# Patient Record
Sex: FEMALE | Race: WHITE | NOT HISPANIC OR LATINO | Employment: OTHER | URBAN - METROPOLITAN AREA
[De-identification: names, ages, dates, MRNs, and addresses within clinical notes are randomized per-mention and may not be internally consistent; named-entity substitution may affect disease eponyms.]

---

## 2019-05-14 ENCOUNTER — CLINICAL SUPPORT (OUTPATIENT)
Dept: SMOKING CESSATION | Facility: CLINIC | Age: 59
End: 2019-05-14
Payer: COMMERCIAL

## 2019-05-14 VITALS
SYSTOLIC BLOOD PRESSURE: 132 MMHG | HEART RATE: 88 BPM | HEIGHT: 65 IN | WEIGHT: 224 LBS | DIASTOLIC BLOOD PRESSURE: 101 MMHG | BODY MASS INDEX: 37.32 KG/M2 | RESPIRATION RATE: 16 BRPM

## 2019-05-14 DIAGNOSIS — F17.210 HEAVY CIGARETTE SMOKER (20-39 PER DAY): Primary | ICD-10-CM

## 2019-05-14 PROCEDURE — 99404 PREV MED CNSL INDIV APPRX 60: CPT | Mod: S$GLB,,,

## 2019-05-14 PROCEDURE — 99999 PR PBB SHADOW E&M-NEW PATIENT-LVL III: CPT | Mod: PBBFAC,,,

## 2019-05-14 PROCEDURE — 99999 PR PBB SHADOW E&M-NEW PATIENT-LVL III: ICD-10-PCS | Mod: PBBFAC,,,

## 2019-05-14 PROCEDURE — 99404 PR PREVENT COUNSEL,INDIV,60 MIN: ICD-10-PCS | Mod: S$GLB,,,

## 2019-05-14 RX ORDER — IBUPROFEN 200 MG
1 TABLET ORAL DAILY
Qty: 14 PATCH | Refills: 0 | Status: SHIPPED | OUTPATIENT
Start: 2019-05-14 | End: 2019-05-27 | Stop reason: SDUPTHER

## 2019-05-14 NOTE — Clinical Note
Patient will be participating in weekly tobacco cessation meetings and will begin the prescribed tobacco cessation medication regimen of 21 mg patches.Patient applying for the [employer] free Chantix program and will begin the program with nicotine patches. FTND of 8 indicates a high dependence to tobacco. SALOME-D of 2 is perceived as no mental distress or depression at this time.

## 2019-05-20 ENCOUNTER — CLINICAL SUPPORT (OUTPATIENT)
Dept: SMOKING CESSATION | Facility: CLINIC | Age: 59
End: 2019-05-20
Payer: COMMERCIAL

## 2019-05-20 DIAGNOSIS — F17.210 HEAVY CIGARETTE SMOKER (20-39 PER DAY): Primary | ICD-10-CM

## 2019-05-20 PROCEDURE — 99999 PR PBB SHADOW E&M-EST. PATIENT-LVL I: CPT | Mod: PBBFAC,,,

## 2019-05-20 PROCEDURE — 90853 PR GROUP PSYCHOTHERAPY: ICD-10-PCS | Mod: S$GLB,,,

## 2019-05-20 PROCEDURE — 90853 GROUP PSYCHOTHERAPY: CPT | Mod: S$GLB,,,

## 2019-05-20 PROCEDURE — 99999 PR PBB SHADOW E&M-EST. PATIENT-LVL I: ICD-10-PCS | Mod: PBBFAC,,,

## 2019-05-20 NOTE — Clinical Note
Just a note to advise how the patient is progressing in the tobacco cessation program. The patient is smoking 10 cigarettes per day but smoking each at least 2 times and sometimes 3. This increases her habit to a pack or more per day. The patient will begin the habit modification techniques and the 21 mg nicotine patches on 5/21/19.

## 2019-05-21 NOTE — PROGRESS NOTES
Site: [facility]  Date:  5/20/19  Clinical Status of Patient: Outpatient   Length of Service and Code: 60 minutes - 27078   Number in Attendance: 2  Group Activities/Focus of Group:  orientation, client introductions, completion of TCRS (Tobacco Cessation Rating Scale) learned addiction model, cues/triggers, personal reasons for quitting, medications, goals, quit date    Target symptoms:  withdrawal and medication side effects             The following were rated moderate (3) to severe (4) on TCRS:       Moderate 3: none      Severe 4:   none  Patient's Response to Intervention: The patient is smoking 10 cigarettes per day but smoking each at least 2 times and sometimes 3. This increases her habit to a pack or more per day. The patient will begin the habit modification techniques on 5/21/19. Patient remains on prescribed tobacco cessation medication regimen of 21 mg patch without any negative side effects at this time.  Progress Toward Goals and Other Mental Status Changes: The patient denies any abnormal behavioral or mental changes at this time.     Diagnosis: Z72.0  Plan: The patient will continue with group therapy sessions and medication monitoring by CTTS. Prescribed medication management will be by physician.   Return to Clinic: 1 week

## 2019-05-22 ENCOUNTER — TELEPHONE (OUTPATIENT)
Dept: PHARMACY | Facility: CLINIC | Age: 59
End: 2019-05-22

## 2019-05-27 ENCOUNTER — CLINICAL SUPPORT (OUTPATIENT)
Dept: SMOKING CESSATION | Facility: CLINIC | Age: 59
End: 2019-05-27
Payer: COMMERCIAL

## 2019-05-27 DIAGNOSIS — F17.210 HEAVY CIGARETTE SMOKER (20-39 PER DAY): ICD-10-CM

## 2019-05-27 PROCEDURE — 90853 PR GROUP PSYCHOTHERAPY: ICD-10-PCS | Mod: S$GLB,,,

## 2019-05-27 PROCEDURE — 99999 PR PBB SHADOW E&M-EST. PATIENT-LVL I: ICD-10-PCS | Mod: PBBFAC,,,

## 2019-05-27 PROCEDURE — 99999 PR PBB SHADOW E&M-EST. PATIENT-LVL I: CPT | Mod: PBBFAC,,,

## 2019-05-27 PROCEDURE — 90853 GROUP PSYCHOTHERAPY: CPT | Mod: S$GLB,,,

## 2019-05-27 RX ORDER — IBUPROFEN 200 MG
1 TABLET ORAL DAILY
Qty: 14 PATCH | Refills: 0 | Status: SHIPPED | OUTPATIENT
Start: 2019-05-27 | End: 2019-06-18

## 2019-05-27 RX ORDER — BUPROPION HYDROCHLORIDE 150 MG/1
TABLET, EXTENDED RELEASE ORAL
Qty: 60 TABLET | Refills: 0 | Status: SHIPPED | OUTPATIENT
Start: 2019-05-27 | End: 2019-07-08 | Stop reason: SDUPTHER

## 2019-05-28 NOTE — PROGRESS NOTES
Smoking Cessation Group Session #2    Site: [facility]  Date:  5/27/19  Clinical Status of Patient: Outpatient   Length of Service and Code: 60 minutes - 12787   Number in Attendance: 2  Group Activities/Focus of Group:  completion of TCRS (Tobacco Cessation Rating Scale) reviewed strategies, cues, and triggers. Introduced the negative impact of tobacco on health, the health advantages of discontinuing the use of tobacco, time line improved health changes after a quit, withdrawal issues to expect from nicotine and habit, and ways to achieve the goal of a quit.    Target symptoms:  withdrawal and medication side effects             The following were rated moderate (3) to severe (4) on TCRS:       Moderate 3: none     Severe 4:   none  Patient's Response to Intervention: The patient is smoking 10 cigarettes per day but lighting each twice which is a pack a day habit. Patient did not utilize any of the habit modification techniques discussed last week but stated she would try this week. Added 150 mg Wellbutrin SR once a day for 7 days then increasing to BID to aid the patient since the 21 mg nicotine patches are not helping at this time. The patient has applied for the [employer] patient assistance program but has not been approved yet.    Progress Toward Goals and Other Mental Status Changes: The patient denies any abnormal behavioral or mental changes at this time.     Diagnosis: Z72.0  Plan: The patient will continue with group therapy sessions and medication monitoring by CTTS. Prescribed medication management will be by physician.   Return to Clinic: 1 week    Quit Date:    Planned Quit Date:

## 2019-06-03 ENCOUNTER — CLINICAL SUPPORT (OUTPATIENT)
Dept: SMOKING CESSATION | Facility: CLINIC | Age: 59
End: 2019-06-03
Payer: COMMERCIAL

## 2019-06-03 DIAGNOSIS — F17.210 LIGHT CIGARETTE SMOKER (1-9 CIGS/DAY): Primary | ICD-10-CM

## 2019-06-03 PROCEDURE — 90853 PR GROUP PSYCHOTHERAPY: ICD-10-PCS | Mod: S$GLB,,,

## 2019-06-03 PROCEDURE — 99999 PR PBB SHADOW E&M-EST. PATIENT-LVL I: ICD-10-PCS | Mod: PBBFAC,,,

## 2019-06-03 PROCEDURE — 90853 GROUP PSYCHOTHERAPY: CPT | Mod: S$GLB,,,

## 2019-06-03 PROCEDURE — 99999 PR PBB SHADOW E&M-EST. PATIENT-LVL I: CPT | Mod: PBBFAC,,,

## 2019-06-04 ENCOUNTER — TELEPHONE (OUTPATIENT)
Dept: PHARMACY | Facility: CLINIC | Age: 59
End: 2019-06-04

## 2019-06-04 NOTE — PROGRESS NOTES
Smoking Cessation Group Session #2    Site: [facility]  Date:  6/3/19  Clinical Status of Patient: Outpatient   Length of Service and Code: 60 minutes - 51506   Number in Attendance: 2  Group Activities/Focus of Group:  completion of TCRS (Tobacco Cessation Rating Scale) reviewed strategies, controlling environment, cues, triggers, new goals set. Introduced high risk situations with preparation interventions, caffeine similarities with withdrawal issues of habit and nicotine, Alcohol, Understanding urges, cravings, stress and relaxation. Open discussion with intervention discussion.    Target symptoms:  withdrawal and medication side effects             The following were rated moderate (3) to severe (4) on TCRS:       Moderate 3: none     Severe 4:   none  Patient's Response to Intervention: The patient is smoking 6 cigarettes per day and a quit was discussed but not set at this time. Additional habit modification techniques were discussed and will be utilized this week. The patient remains on the prescribed tobacco cessation medication regimen of 150 mg Wellbutrin SR BID with the Chantix BID without any negative side effects at this time.     Progress Toward Goals and Other Mental Status Changes: The patient denies any abnormal behavioral or mental changes at this time.     Diagnosis: Z72.0  Plan: The patient will continue with group therapy sessions and medication monitoring by CTTS. Prescribed medication management will be by physician.   Return to Clinic: 1 week    Quit Date:    Planned Quit Date:

## 2019-06-10 ENCOUNTER — CLINICAL SUPPORT (OUTPATIENT)
Dept: SMOKING CESSATION | Facility: CLINIC | Age: 59
End: 2019-06-10
Payer: COMMERCIAL

## 2019-06-10 DIAGNOSIS — F17.210 CIGARETTE NICOTINE DEPENDENCE, UNCOMPLICATED: Primary | ICD-10-CM

## 2019-06-10 PROCEDURE — 99404 PREV MED CNSL INDIV APPRX 60: CPT | Mod: S$GLB,,,

## 2019-06-10 PROCEDURE — 99999 PR PBB SHADOW E&M-EST. PATIENT-LVL I: CPT | Mod: PBBFAC,,,

## 2019-06-10 PROCEDURE — 99999 PR PBB SHADOW E&M-EST. PATIENT-LVL I: ICD-10-PCS | Mod: PBBFAC,,,

## 2019-06-10 PROCEDURE — 99404 PR PREVENT COUNSEL,INDIV,60 MIN: ICD-10-PCS | Mod: S$GLB,,,

## 2019-06-11 NOTE — PROGRESS NOTES
Individual Follow-Up Form    6/10/19    Quit Date: 6/9/19    Clinical Status of Patient: Outpatient    Length of Service: 60 minutes    Continuing Medication: yes  Chantix    Other Medications: 150 mg Wellbutrin SR     Target Symptoms: Withdrawal and medication side effects. The following were  rated moderate (3) to severe (4) on TCRS:  · Moderate (3): none  · Severe (4): Desire, crave tobacco: reviewed with the patient habit versus nicotine side effects with a quit attempt.     Comments: completion of TCRS (Tobacco Cessation Rating Scale) reviewed strategies, habitual behavior, high risks situations, understanding urges and cravings, stress and relaxation with open discussion and additional interventions, Introduced lapses, relapses, understanding them and analyzing the situation of a lapse, conflict issues that may be linked to a lapse. The patient is tobacco free as of 6/9/19 and is experiencing the following negative side effect: Desire, crave tobacco: reviewed with the patient habit versus nicotine side effects with a quit attempt. The patient remains on the prescribed tobacco cessation medication regimen of 1 mg Chantix BID with the 150 mg Wellbutrin SR BID without any negative side effects at this time. The patient denies any abnormal behavioral or mental changes at this time. The patient will continue with group therapy sessions and medication monitoring by CTTS. Prescribed medication management will be by physician.     Diagnosis: F17.210    Next Visit: 1 week

## 2019-06-17 ENCOUNTER — CLINICAL SUPPORT (OUTPATIENT)
Dept: SMOKING CESSATION | Facility: CLINIC | Age: 59
End: 2019-06-17
Payer: COMMERCIAL

## 2019-06-17 DIAGNOSIS — F17.210 CIGARETTE NICOTINE DEPENDENCE, UNCOMPLICATED: Primary | ICD-10-CM

## 2019-06-17 PROCEDURE — 99999 PR PBB SHADOW E&M-EST. PATIENT-LVL I: CPT | Mod: PBBFAC,,,

## 2019-06-17 PROCEDURE — 99999 PR PBB SHADOW E&M-EST. PATIENT-LVL I: ICD-10-PCS | Mod: PBBFAC,,,

## 2019-06-17 PROCEDURE — 99404 PR PREVENT COUNSEL,INDIV,60 MIN: ICD-10-PCS | Mod: S$GLB,,,

## 2019-06-17 PROCEDURE — 99404 PREV MED CNSL INDIV APPRX 60: CPT | Mod: S$GLB,,,

## 2019-06-18 RX ORDER — VARENICLINE TARTRATE 1 MG/1
0.5 TABLET, FILM COATED ORAL 2 TIMES DAILY
COMMUNITY
End: 2019-07-23 | Stop reason: ALTCHOICE

## 2019-06-18 NOTE — PROGRESS NOTES
Individual Follow-Up Form    6/17/19    Quit Date: 6/9/19    Clinical Status of Patient: Outpatient    Length of Service: 60 minutes    Continuing Medication: yes  Chantix    Other Medications: Wellbutrin 150 mg SR BID     Target Symptoms: Withdrawal and medication side effects. The following were  rated moderate (3) to severe (4) on TCRS:  · Moderate (3): none  · Severe (4): none    Comments: completion of TCRS (Tobacco Cessation Rating Scale) reviewed strategies, habitual behavior, stress, and high risk situations. Introduced stress with addition interventions, SOLVE, relaxation with interventions, nutrition, exercise, weight gain, and the importance of rewarding oneself for accomplishments toward becoming tobacco free. Open discussion of all items with interventions. The patient remains tobacco free as of 6/9/19 and is not experiencing any negative side effects at this time. The patient [place] and did not experience any issues that she expected to experience. The patient remains on the prescribed tobacco cessation medication regimen of 0.5 mg Chantix once a day with the 150 mg Wellbutrin SR BID without any negative side effects at this time. The patient denies any abnormal behavioral or mental changes at this time. The patient will continue with group therapy sessions and medication monitoring by CTTS. Prescribed medication management will be by physician.     Diagnosis: F17.210    Next Visit: 1 week

## 2019-06-24 ENCOUNTER — CLINICAL SUPPORT (OUTPATIENT)
Dept: SMOKING CESSATION | Facility: CLINIC | Age: 59
End: 2019-06-24
Payer: COMMERCIAL

## 2019-06-24 DIAGNOSIS — F17.210 CIGARETTE NICOTINE DEPENDENCE, UNCOMPLICATED: Primary | ICD-10-CM

## 2019-06-24 PROCEDURE — 99999 PR PBB SHADOW E&M-EST. PATIENT-LVL I: ICD-10-PCS | Mod: PBBFAC,,,

## 2019-06-24 PROCEDURE — 99404 PR PREVENT COUNSEL,INDIV,60 MIN: ICD-10-PCS | Mod: S$GLB,,,

## 2019-06-24 PROCEDURE — 99999 PR PBB SHADOW E&M-EST. PATIENT-LVL I: CPT | Mod: PBBFAC,,,

## 2019-06-24 PROCEDURE — 99404 PREV MED CNSL INDIV APPRX 60: CPT | Mod: S$GLB,,,

## 2019-06-24 NOTE — Clinical Note
Just a note to advise how the patient is progressing in the tobacco cessation program. The patient remains tobacco free as of 6/9/19 and is not experiencing any negative side effects at this time. The patient did discuss a strong urge with the anxiety of going out to a play but was able to over come. Additional interventions were discussed and reviewed relaxation exercise to aid the patient. The patient remains on the prescribed tobacco cessation medication regimen of 1 mg Chantix once a day with the 150 mg Wellbutrin SR BID without any negative side effects at this time.

## 2019-06-25 NOTE — PROGRESS NOTES
Individual Follow-Up Form    6/24/19    Quit Date: 6/9/19    Clinical Status of Patient: Outpatient    Length of Service: 60 minutes    Continuing Medication: yes  Chantix    Other Medications: Wellbutrin 150 mg SR     Target Symptoms: Withdrawal and medication side effects. The following were  rated moderate (3) to severe (4) on TCRS:  · Moderate (3): none  · Severe (4): none    Comments: completion of TCRS (Tobacco Cessation Rating Scale) reviewed strategies, habitual behavior, high risks situations, understanding urges and cravings, stress and relaxation with open discussion and additional interventions, Introduced lapses, relapses, understanding them and analyzing the situation of a lapse, conflict issues that may be linked to a lapse. The patient remains tobacco free as of 6/9/19 and is not experiencing any negative side effects at this time. The patient did discuss a strong urge with the anxiety of going out to a play but was able to over come. Additional interventions were discussed and reviewed relaxation exercise to aid the patient. The patient remains on the prescribed tobacco cessation medication regimen of 1 mg Chantix once a day with the 150 mg Wellbutrin SR BID without any negative side effects at this time. The patient denies any abnormal behavioral or mental changes at this time. The patient will continue with group therapy sessions and medication monitoring by CTTS. Prescribed medication management will be by physician.     Diagnosis: F17.210    Next Visit: 2 weeks

## 2019-07-01 ENCOUNTER — CLINICAL SUPPORT (OUTPATIENT)
Dept: SMOKING CESSATION | Facility: CLINIC | Age: 59
End: 2019-07-01
Payer: COMMERCIAL

## 2019-07-01 DIAGNOSIS — F17.210 CIGARETTE NICOTINE DEPENDENCE, UNCOMPLICATED: Primary | ICD-10-CM

## 2019-07-01 PROCEDURE — 99999 PR PBB SHADOW E&M-EST. PATIENT-LVL I: ICD-10-PCS | Mod: PBBFAC,,,

## 2019-07-01 PROCEDURE — 99999 PR PBB SHADOW E&M-EST. PATIENT-LVL I: CPT | Mod: PBBFAC,,,

## 2019-07-01 PROCEDURE — 99406 BEHAV CHNG SMOKING 3-10 MIN: CPT | Mod: S$GLB,,,

## 2019-07-01 PROCEDURE — 99406 PR TOBACCO USE CESSATION INTERMEDIATE 3-10 MINUTES: ICD-10-PCS | Mod: S$GLB,,,

## 2019-07-08 ENCOUNTER — CLINICAL SUPPORT (OUTPATIENT)
Dept: SMOKING CESSATION | Facility: CLINIC | Age: 59
End: 2019-07-08
Payer: COMMERCIAL

## 2019-07-08 DIAGNOSIS — F17.210 CIGARETTE NICOTINE DEPENDENCE, UNCOMPLICATED: Primary | ICD-10-CM

## 2019-07-08 PROCEDURE — 99999 PR PBB SHADOW E&M-EST. PATIENT-LVL I: CPT | Mod: PBBFAC,,,

## 2019-07-08 PROCEDURE — 99404 PR PREVENT COUNSEL,INDIV,60 MIN: ICD-10-PCS | Mod: S$GLB,,,

## 2019-07-08 PROCEDURE — 99999 PR PBB SHADOW E&M-EST. PATIENT-LVL I: ICD-10-PCS | Mod: PBBFAC,,,

## 2019-07-08 PROCEDURE — 99404 PREV MED CNSL INDIV APPRX 60: CPT | Mod: S$GLB,,,

## 2019-07-08 RX ORDER — BUPROPION HYDROCHLORIDE 150 MG/1
150 TABLET, EXTENDED RELEASE ORAL 2 TIMES DAILY
Qty: 60 TABLET | Refills: 0 | Status: SHIPPED | OUTPATIENT
Start: 2019-07-08 | End: 2019-08-12 | Stop reason: SDUPTHER

## 2019-07-08 NOTE — Clinical Note
Just a note to advise how the patient is progressing in the tobacco cessation program. The patient remains tobacco free as of 6/9/19 and is not experiencing any negative side effects from the quit at this time. The patient remains on the prescribed tobacco cessation medication regimen of 1 mg Chantix BID with the 150 mg Wellbutrin SR BID without any negative side effects at this time.

## 2019-07-09 NOTE — PROGRESS NOTES
Individual Follow-Up Form    7/9/2019    Quit Date: 6/9/19    Clinical Status of Patient: Outpatient    Length of Service: 60 minutes    Continuing Medication: yes  Chantix    Other Medications: 150 mg Wellbutrin SR BID     Target Symptoms: Withdrawal and medication side effects. The following were  rated moderate (3) to severe (4) on TCRS:  · Moderate (3): none  · Severe (4): none    Comments: completion of TCRS (Tobacco Cessation Rating Scale) reviewed strategies, cues, triggers, high risk situations, lapses, relapses, diet, exercise, stress, relaxation, sleep, habitual behavior, and life style changes. The patient remains tobacco free as of 6/9/19 and is not experiencing any negative side effects from the quit at this time. The patient remains on the prescribed tobacco cessation medication regimen of 1 mg Chantix BID with the 150 mg Wellbutrin SR BID without any negative side effects at this time. The patient denies any abnormal behavioral or mental changes at this time. The patient will continue with group therapy sessions and medication monitoring by CTTS. Prescribed medication management will be by physician.     Diagnosis: F17.210    Next Visit: 2 weeks

## 2019-07-22 ENCOUNTER — CLINICAL SUPPORT (OUTPATIENT)
Dept: SMOKING CESSATION | Facility: CLINIC | Age: 59
End: 2019-07-22
Payer: COMMERCIAL

## 2019-07-22 DIAGNOSIS — F17.210 CIGARETTE NICOTINE DEPENDENCE, UNCOMPLICATED: Primary | ICD-10-CM

## 2019-07-22 PROCEDURE — 99999 PR PBB SHADOW E&M-EST. PATIENT-LVL I: ICD-10-PCS | Mod: PBBFAC,,,

## 2019-07-22 PROCEDURE — 99999 PR PBB SHADOW E&M-EST. PATIENT-LVL I: CPT | Mod: PBBFAC,,,

## 2019-07-22 PROCEDURE — 99404 PREV MED CNSL INDIV APPRX 60: CPT | Mod: S$GLB,,,

## 2019-07-22 PROCEDURE — 99404 PR PREVENT COUNSEL,INDIV,60 MIN: ICD-10-PCS | Mod: S$GLB,,,

## 2019-07-23 NOTE — PROGRESS NOTES
Individual Follow-Up Form    7/22/19    Quit Date: 6/9/19    Clinical Status of Patient: Outpatient    Length of Service: 60 minutes    Continuing Medication: yes  Wellbutrin    Other Medications:      Target Symptoms: Withdrawal and medication side effects. The following were  rated moderate (3) to severe (4) on TCRS:  · Moderate (3): none  · Severe (4): none    Comments: completion of TCRS (Tobacco Cessation Rating Scale) reviewed strategies, habitual behavior, high risks situations, understanding urges and cravings, stress and relaxation with open discussion and additional interventions, Introduced lapses, relapses, understanding them and analyzing the situation of a lapse, conflict issues that may be linked to a lapse. The patient is tobacco free as of 6/9/19 and is not experiencing any negative side effects from the quit at this time. The patient is no longer taking the Chantix but will remain on the Wellbutrin 150 mg SR BID and is not experiencing any negative side effects at this time. The patient denies any abnormal behavioral or mental changes at this time. The patient will continue with individual therapy sessions and medication monitoring by CTTS. Prescribed medication management will be by physician.     Diagnosis: F17.210    Next Visit: 2 weeks

## 2019-08-12 ENCOUNTER — CLINICAL SUPPORT (OUTPATIENT)
Dept: SMOKING CESSATION | Facility: CLINIC | Age: 59
End: 2019-08-12
Payer: COMMERCIAL

## 2019-08-12 DIAGNOSIS — F17.210 CIGARETTE NICOTINE DEPENDENCE, UNCOMPLICATED: ICD-10-CM

## 2019-08-12 PROCEDURE — 99403 PR PREVENT COUNSEL,INDIV,45 MIN: ICD-10-PCS | Mod: S$GLB,,,

## 2019-08-12 PROCEDURE — 99999 PR PBB SHADOW E&M-EST. PATIENT-LVL I: CPT | Mod: PBBFAC,,,

## 2019-08-12 PROCEDURE — 99403 PREV MED CNSL INDIV APPRX 45: CPT | Mod: S$GLB,,,

## 2019-08-12 PROCEDURE — 99999 PR PBB SHADOW E&M-EST. PATIENT-LVL I: ICD-10-PCS | Mod: PBBFAC,,,

## 2019-08-12 RX ORDER — BUPROPION HYDROCHLORIDE 150 MG/1
150 TABLET, EXTENDED RELEASE ORAL 2 TIMES DAILY
Qty: 60 TABLET | Refills: 0 | Status: SHIPPED | OUTPATIENT
Start: 2019-08-12 | End: 2019-09-10 | Stop reason: SDUPTHER

## 2019-08-13 NOTE — PROGRESS NOTES
Individual Follow-Up Form    8/12/19    Quit Date: 6/9/19    Clinical Status of Patient: Outpatient    Length of Service: 45 minutes    Continuing Medication: yes  Wellbutrin    Other Medications:      Target Symptoms: Withdrawal and medication side effects. The following were  rated moderate (3) to severe (4) on TCRS:  · Moderate (3): none  · Severe (4): none    Comments: completion of TCRS (Tobacco Cessation Rating Scale) reviewed strategies, cues, triggers, high risk situations, lapses, relapses, diet, exercise, stress, relaxation, sleep, habitual behavior, and life style changes. The patient is tobacco free as of 6/9/19 and is not experiencing any negative side effects at this time. The patient remains on the prescribed tobacco cessation medication regimen of 150 mg Wellbutrin SR BID without any negative side effects at this time and will continue to come in for follow up until off of all medications for tobacco use. The patient denies any abnormal behavioral or mental changes at this time. The patient will continue with group therapy sessions and medication monitoring by CTTS. Prescribed medication management will be by physician.     Diagnosis: F17.210    Next Visit: 2 weeks

## 2019-09-10 ENCOUNTER — CLINICAL SUPPORT (OUTPATIENT)
Dept: SMOKING CESSATION | Facility: CLINIC | Age: 59
End: 2019-09-10
Payer: COMMERCIAL

## 2019-09-10 DIAGNOSIS — F17.210 CIGARETTE NICOTINE DEPENDENCE, UNCOMPLICATED: ICD-10-CM

## 2019-09-10 PROCEDURE — 99403 PREV MED CNSL INDIV APPRX 45: CPT | Mod: S$GLB,,,

## 2019-09-10 PROCEDURE — 99999 PR PBB SHADOW E&M-EST. PATIENT-LVL I: ICD-10-PCS | Mod: PBBFAC,,,

## 2019-09-10 PROCEDURE — 99403 PR PREVENT COUNSEL,INDIV,45 MIN: ICD-10-PCS | Mod: S$GLB,,,

## 2019-09-10 PROCEDURE — 99999 PR PBB SHADOW E&M-EST. PATIENT-LVL I: CPT | Mod: PBBFAC,,,

## 2019-09-10 RX ORDER — BUPROPION HYDROCHLORIDE 150 MG/1
150 TABLET, EXTENDED RELEASE ORAL 2 TIMES DAILY
Qty: 60 TABLET | Refills: 0 | Status: SHIPPED | OUTPATIENT
Start: 2019-09-10 | End: 2019-10-14 | Stop reason: SDUPTHER

## 2019-09-10 NOTE — Clinical Note
Just a note to advise how the patient is progressing in the tobacco cessation program. The patient remains tobacco free as of 6/9/2019 and is not experiencing any negative side effects at this time but will continue follow up visits until off of all tobacco cessation medication. The patient remains on the prescribed tobacco cessation medication regimen of 150 mg Wellbutrin SR BID without any negative side effects at this time. The patient denies any abnormal behavioral or mental changes at this time.

## 2019-09-11 NOTE — PROGRESS NOTES
Individual Follow-Up Form    9/10/2019    Quit Date: 6/9/2019    Clinical Status of Patient: Outpatient    Length of Service: 30 minutes    Continuing Medication: yes  Wellbutrin    Other Medications:      Target Symptoms: Withdrawal and medication side effects. The following were  rated moderate (3) to severe (4) on TCRS:  · Moderate (3): none  · Severe (4): none    Comments: completion of TCRS (Tobacco Cessation Rating Scale) reviewed strategies, cues, triggers, high risk situations, lapses, relapses, diet, exercise, stress, relaxation, sleep, habitual behavior, and life style changes. The patient remains tobacco free as of 6/9/2019 and is not experiencing any negative side effects at this time but will continue follow up visits until off of all tobacco cessation medication. The patient remains on the prescribed tobacco cessation medication regimen of 150 mg Wellbutrin SR BID without any negative side effects at this time. The patient denies any abnormal behavioral or mental changes at this time. The patient will continue with individual therapy sessions and medication monitoring by CTTS. Prescribed medication management will be by physician.     Diagnosis: F17.210    Next Visit: 1 week

## 2019-09-25 ENCOUNTER — TELEPHONE (OUTPATIENT)
Dept: SMOKING CESSATION | Facility: CLINIC | Age: 59
End: 2019-09-25

## 2019-09-25 ENCOUNTER — CLINICAL SUPPORT (OUTPATIENT)
Dept: SMOKING CESSATION | Facility: CLINIC | Age: 59
End: 2019-09-25
Payer: COMMERCIAL

## 2019-09-25 DIAGNOSIS — F17.210 CIGARETTE NICOTINE DEPENDENCE, UNCOMPLICATED: Primary | ICD-10-CM

## 2019-09-25 PROCEDURE — 99999 PR PBB SHADOW E&M-EST. PATIENT-LVL I: ICD-10-PCS | Mod: PBBFAC,,,

## 2019-09-25 PROCEDURE — 99407 PR TOBACCO USE CESSATION INTENSIVE >10 MINUTES: ICD-10-PCS | Mod: S$GLB,,,

## 2019-09-25 PROCEDURE — 99407 BEHAV CHNG SMOKING > 10 MIN: CPT | Mod: S$GLB,,,

## 2019-09-25 PROCEDURE — 99999 PR PBB SHADOW E&M-EST. PATIENT-LVL I: CPT | Mod: PBBFAC,,,

## 2019-09-25 NOTE — TELEPHONE ENCOUNTER
Called patient about rescheduling an appointment for 10/7/19. Failed contact left a message with information to call back.

## 2019-10-14 ENCOUNTER — CLINICAL SUPPORT (OUTPATIENT)
Dept: SMOKING CESSATION | Facility: CLINIC | Age: 59
End: 2019-10-14
Payer: COMMERCIAL

## 2019-10-14 DIAGNOSIS — F17.210 CIGARETTE NICOTINE DEPENDENCE, UNCOMPLICATED: ICD-10-CM

## 2019-10-14 PROCEDURE — 99999 PR PBB SHADOW E&M-EST. PATIENT-LVL I: CPT | Mod: PBBFAC,,,

## 2019-10-14 PROCEDURE — 99404 PR PREVENT COUNSEL,INDIV,60 MIN: ICD-10-PCS | Mod: S$GLB,,,

## 2019-10-14 PROCEDURE — 99404 PREV MED CNSL INDIV APPRX 60: CPT | Mod: S$GLB,,,

## 2019-10-14 PROCEDURE — 99999 PR PBB SHADOW E&M-EST. PATIENT-LVL I: ICD-10-PCS | Mod: PBBFAC,,,

## 2019-10-14 RX ORDER — BUPROPION HYDROCHLORIDE 150 MG/1
150 TABLET, EXTENDED RELEASE ORAL 2 TIMES DAILY
Qty: 60 TABLET | Refills: 0 | Status: SHIPPED | OUTPATIENT
Start: 2019-10-14 | End: 2019-11-12 | Stop reason: SDUPTHER

## 2019-10-15 NOTE — PROGRESS NOTES
Individual Follow-Up Form    10/14/2019    Quit Date: 6/9/2019    Clinical Status of Patient: Outpatient    Length of Service: 60 minutes    Continuing Medication: yes  Wellbutrin    Other Medications:      Target Symptoms: Withdrawal and medication side effects. The following were  rated moderate (3) to severe (4) on TCRS:  · Moderate (3): none  · Severe (4): none    Comments: completion of TCRS (Tobacco Cessation Rating Scale) reviewed strategies, cues, triggers, high risk situations, lapses, relapses, diet, exercise, stress, relaxation, sleep, habitual behavior, and life style changes. The patient remains tobacco free as of 6/9/19 but is still under a lot of stress due to her partners personality change after his quit. Patient is trying to stay committed to the quit even though the partner is expressing that it is her that changed and that they need to go back to smoking. The patient remains on the prescribed tobacco cessation medication regimen of 150 mg Wellbutrin SR BID without any negative side effects at this time. The patient denies any abnormal behavioral or mental changes at this time.     Diagnosis: F17.210    Next Visit: 1 month

## 2019-11-09 ENCOUNTER — TELEPHONE (OUTPATIENT)
Dept: SMOKING CESSATION | Facility: CLINIC | Age: 59
End: 2019-11-09

## 2019-11-12 ENCOUNTER — CLINICAL SUPPORT (OUTPATIENT)
Dept: SMOKING CESSATION | Facility: CLINIC | Age: 59
End: 2019-11-12
Payer: COMMERCIAL

## 2019-11-12 DIAGNOSIS — F17.210 CIGARETTE NICOTINE DEPENDENCE, UNCOMPLICATED: ICD-10-CM

## 2019-11-12 PROCEDURE — 99999 PR PBB SHADOW E&M-EST. PATIENT-LVL I: CPT | Mod: PBBFAC,,,

## 2019-11-12 PROCEDURE — 99404 PREV MED CNSL INDIV APPRX 60: CPT | Mod: S$GLB,,,

## 2019-11-12 PROCEDURE — 99404 PR PREVENT COUNSEL,INDIV,60 MIN: ICD-10-PCS | Mod: S$GLB,,,

## 2019-11-12 PROCEDURE — 99999 PR PBB SHADOW E&M-EST. PATIENT-LVL I: ICD-10-PCS | Mod: PBBFAC,,,

## 2019-11-12 RX ORDER — BUPROPION HYDROCHLORIDE 150 MG/1
150 TABLET, EXTENDED RELEASE ORAL 2 TIMES DAILY
Qty: 60 TABLET | Refills: 0 | Status: SHIPPED | OUTPATIENT
Start: 2019-11-12 | End: 2019-12-10 | Stop reason: SDUPTHER

## 2019-11-12 NOTE — PROGRESS NOTES
----- Message from Uri Keyes DO sent at 6/26/2017  4:42 PM CDT -----  Please let the patient know stable chest x-ray with out any acute findings.   Continue plan as outlined at last office visit.        Individual Follow-Up Form    11/12/2019    Quit Date: 06/09/2019    Clinical Status of Patient: Outpatient    Length of Service: 30 minutes    Continuing Medication: yes  Wellbutrin    Other Medications: none     Target Symptoms: Withdrawal and medication side effects. The following were  rated moderate (3) to severe (4) on TCRS:  · Moderate (3): none  · Severe (4): none    Comments: completion of TCRS (Tobacco Cessation Rating Scale) reviewed strategies, cues, triggers, high risk situations, lapses, relapses, diet, exercise, stress, relaxation, sleep, habitual behavior, and life style changes. Patient is tobacco free.  Patient is doing great and confident she will remain tobacco free.  The patient denies any abnormal behavioral or mental changes at this time. The patient will continue with group or individual therapy sessions and medication monitoring by CTTS. Prescribed medication management will be by physician.       Diagnosis: F17.210    Next Visit: 3 weeks

## 2019-11-21 ENCOUNTER — TELEPHONE (OUTPATIENT)
Dept: SMOKING CESSATION | Facility: CLINIC | Age: 59
End: 2019-11-21

## 2019-12-03 ENCOUNTER — CLINICAL SUPPORT (OUTPATIENT)
Dept: SMOKING CESSATION | Facility: CLINIC | Age: 59
End: 2019-12-03
Payer: COMMERCIAL

## 2019-12-03 DIAGNOSIS — F17.210 CIGARETTE NICOTINE DEPENDENCE, UNCOMPLICATED: Primary | ICD-10-CM

## 2019-12-03 PROCEDURE — 99999 PR PBB SHADOW E&M-EST. PATIENT-LVL I: CPT | Mod: PBBFAC,,,

## 2019-12-03 PROCEDURE — 99403 PR PREVENT COUNSEL,INDIV,45 MIN: ICD-10-PCS | Mod: S$GLB,,,

## 2019-12-03 PROCEDURE — 99999 PR PBB SHADOW E&M-EST. PATIENT-LVL I: ICD-10-PCS | Mod: PBBFAC,,,

## 2019-12-03 PROCEDURE — 99403 PREV MED CNSL INDIV APPRX 45: CPT | Mod: S$GLB,,,

## 2019-12-04 NOTE — PROGRESS NOTES
Individual Follow-Up Form    12/3/19    Quit Date: 6/9/19    Clinical Status of Patient: Outpatient    Length of Service: 45 minutes    Continuing Medication: yes  Wellbutrin    Other Medications:      Target Symptoms: Withdrawal and medication side effects. The following were  rated moderate (3) to severe (4) on TCRS:  · Moderate (3): none  · Severe (4): none    Comments: completion of TCRS (Tobacco Cessation Rating Scale) reviewed strategies, cues, triggers, high risk situations, lapses, relapses, diet, exercise, stress, relaxation, sleep, habitual behavior, and life style changes. The patient remains tobacco free as of 6/9/19 and commended on her 6 months. The patient remains on the prescribed tobacco cessation medication regimen of 150 mg Wellbutrin SR BID without any negative side effects at this time. The patient denies any abnormal behavioral or mental changes at this time. .    Diagnosis: F17.210    Next Visit: 1 week

## 2019-12-10 ENCOUNTER — TELEPHONE (OUTPATIENT)
Dept: SMOKING CESSATION | Facility: CLINIC | Age: 59
End: 2019-12-10

## 2019-12-10 ENCOUNTER — CLINICAL SUPPORT (OUTPATIENT)
Dept: SMOKING CESSATION | Facility: CLINIC | Age: 59
End: 2019-12-10
Payer: COMMERCIAL

## 2019-12-10 DIAGNOSIS — F17.210 CIGARETTE NICOTINE DEPENDENCE, UNCOMPLICATED: Primary | ICD-10-CM

## 2019-12-10 PROCEDURE — 99406 PR TOBACCO USE CESSATION INTERMEDIATE 3-10 MINUTES: ICD-10-PCS | Mod: S$GLB,,,

## 2019-12-10 PROCEDURE — 99406 BEHAV CHNG SMOKING 3-10 MIN: CPT | Mod: S$GLB,,,

## 2019-12-10 PROCEDURE — 99999 PR PBB SHADOW E&M-EST. PATIENT-LVL I: ICD-10-PCS | Mod: PBBFAC,,,

## 2019-12-10 PROCEDURE — 99999 PR PBB SHADOW E&M-EST. PATIENT-LVL I: CPT | Mod: PBBFAC,,,

## 2019-12-10 RX ORDER — BUPROPION HYDROCHLORIDE 150 MG/1
150 TABLET, EXTENDED RELEASE ORAL 2 TIMES DAILY
Qty: 60 TABLET | Refills: 0 | Status: SHIPPED | OUTPATIENT
Start: 2019-12-10 | End: 2020-01-09

## 2019-12-10 NOTE — TELEPHONE ENCOUNTER
Called patient that she would not be able to get the Wellbutrin through the trust due to going over the amount offered.

## 2020-05-18 ENCOUNTER — CLINICAL SUPPORT (OUTPATIENT)
Dept: SMOKING CESSATION | Facility: CLINIC | Age: 60
End: 2020-05-18
Payer: COMMERCIAL

## 2020-05-18 DIAGNOSIS — F17.200 NICOTINE DEPENDENCE: Primary | ICD-10-CM

## 2020-05-18 PROCEDURE — 99407 BEHAV CHNG SMOKING > 10 MIN: CPT | Mod: S$GLB,,,

## 2020-05-18 PROCEDURE — 99407 PR TOBACCO USE CESSATION INTENSIVE >10 MINUTES: ICD-10-PCS | Mod: S$GLB,,,

## 2020-05-18 NOTE — PROGRESS NOTES
Called pt to f/u on her 12 month smoking cessation quit status. Pt stated she remains tobacco free for almost a year. Congratulated her on her hard work and success. Informed her of benefit period, phone follow ups, and contact information. Will complete 3, 6, and 12 month smart form and resolve quit #1 episode.

## 2021-03-29 ENCOUNTER — IMMUNIZATION (OUTPATIENT)
Dept: PRIMARY CARE CLINIC | Facility: CLINIC | Age: 61
End: 2021-03-29

## 2021-03-29 DIAGNOSIS — Z23 NEED FOR VACCINATION: Primary | ICD-10-CM

## 2021-03-29 PROCEDURE — 0011A PR IMMUNIZ ADMIN, SARS-COV-2 COVID-19 VACC, 100MCG/0.5ML, 1ST DOSE: ICD-10-PCS | Mod: CV19,S$GLB,, | Performed by: INTERNAL MEDICINE

## 2021-03-29 PROCEDURE — 0011A PR IMMUNIZ ADMIN, SARS-COV-2 COVID-19 VACC, 100MCG/0.5ML, 1ST DOSE: CPT | Mod: CV19,S$GLB,, | Performed by: INTERNAL MEDICINE

## 2021-03-29 PROCEDURE — 91301 PR SARS-COV-2 COVID-19 VACCINE, NO PRSV, 100MCG/0.5ML, IM: CPT | Mod: S$GLB,,, | Performed by: INTERNAL MEDICINE

## 2021-03-29 PROCEDURE — 91301 PR SARS-COV-2 COVID-19 VACCINE, NO PRSV, 100MCG/0.5ML, IM: ICD-10-PCS | Mod: S$GLB,,, | Performed by: INTERNAL MEDICINE

## 2021-03-29 RX ADMIN — Medication 0.5 ML: at 10:03

## 2021-04-28 ENCOUNTER — IMMUNIZATION (OUTPATIENT)
Dept: PRIMARY CARE CLINIC | Facility: CLINIC | Age: 61
End: 2021-04-28
Payer: COMMERCIAL

## 2021-04-28 DIAGNOSIS — Z23 NEED FOR VACCINATION: Primary | ICD-10-CM

## 2021-04-28 PROCEDURE — 91301 PR SARS-COV-2 COVID-19 VACCINE, NO PRSV, 100MCG/0.5ML, IM: ICD-10-PCS | Mod: S$GLB,,, | Performed by: INTERNAL MEDICINE

## 2021-04-28 PROCEDURE — 91301 PR SARS-COV-2 COVID-19 VACCINE, NO PRSV, 100MCG/0.5ML, IM: CPT | Mod: S$GLB,,, | Performed by: INTERNAL MEDICINE

## 2021-04-28 PROCEDURE — 0012A PR IMMUNIZ ADMIN, SARS-COV-2 COVID-19 VACC, 100MCG/0.5ML, 2ND DOSE: ICD-10-PCS | Mod: CV19,S$GLB,, | Performed by: INTERNAL MEDICINE

## 2021-04-28 PROCEDURE — 0012A PR IMMUNIZ ADMIN, SARS-COV-2 COVID-19 VACC, 100MCG/0.5ML, 2ND DOSE: CPT | Mod: CV19,S$GLB,, | Performed by: INTERNAL MEDICINE

## 2021-04-28 RX ADMIN — Medication 0.5 ML: at 11:04

## 2023-09-02 NOTE — Clinical Note
Just a note to advise how the patient is progressing in the tobacco cessation program.  Patient is tobacco free.  Patient is doing great and confident she will remain tobacco free.  The patient denies any abnormal behavioral or mental changes at this time. The patient will continue with group or individual therapy sessions and medication monitoring by CTTS. Prescribed medication management will be by physician. 02-Sep-2023 01:48

## 2023-10-11 ENCOUNTER — HOSPITAL ENCOUNTER (EMERGENCY)
Facility: HOSPITAL | Age: 63
Discharge: HOME | End: 2023-10-12
Attending: EMERGENCY MEDICINE

## 2023-10-11 DIAGNOSIS — R10.13 EPIGASTRIC PAIN: ICD-10-CM

## 2023-10-11 DIAGNOSIS — K29.70 GASTRITIS, PRESENCE OF BLEEDING UNSPECIFIED, UNSPECIFIED CHRONICITY, UNSPECIFIED GASTRITIS TYPE: Primary | ICD-10-CM

## 2023-10-11 DIAGNOSIS — R07.9 CHEST PAIN: ICD-10-CM

## 2023-10-11 PROCEDURE — 83690 ASSAY OF LIPASE: CPT | Performed by: NURSE PRACTITIONER

## 2023-10-11 PROCEDURE — 99285 EMERGENCY DEPT VISIT HI MDM: CPT | Mod: 25

## 2023-10-11 PROCEDURE — 93010 ELECTROCARDIOGRAM REPORT: CPT | Mod: ,,, | Performed by: INTERNAL MEDICINE

## 2023-10-11 PROCEDURE — 63600175 PHARM REV CODE 636 W HCPCS: Performed by: NURSE PRACTITIONER

## 2023-10-11 PROCEDURE — 80048 BASIC METABOLIC PNL TOTAL CA: CPT | Mod: XB

## 2023-10-11 PROCEDURE — 96375 TX/PRO/DX INJ NEW DRUG ADDON: CPT

## 2023-10-11 PROCEDURE — 85025 COMPLETE CBC W/AUTO DIFF WBC: CPT | Performed by: NURSE PRACTITIONER

## 2023-10-11 PROCEDURE — 93010 EKG 12-LEAD: ICD-10-PCS | Mod: ,,, | Performed by: INTERNAL MEDICINE

## 2023-10-11 PROCEDURE — 93005 ELECTROCARDIOGRAM TRACING: CPT

## 2023-10-11 PROCEDURE — 80053 COMPREHEN METABOLIC PANEL: CPT | Performed by: NURSE PRACTITIONER

## 2023-10-11 PROCEDURE — 84484 ASSAY OF TROPONIN QUANT: CPT | Performed by: NURSE PRACTITIONER

## 2023-10-11 PROCEDURE — 86803 HEPATITIS C AB TEST: CPT | Performed by: EMERGENCY MEDICINE

## 2023-10-11 PROCEDURE — 87389 HIV-1 AG W/HIV-1&-2 AB AG IA: CPT | Performed by: EMERGENCY MEDICINE

## 2023-10-11 PROCEDURE — 83880 ASSAY OF NATRIURETIC PEPTIDE: CPT | Performed by: NURSE PRACTITIONER

## 2023-10-11 PROCEDURE — 96374 THER/PROPH/DIAG INJ IV PUSH: CPT

## 2023-10-11 RX ORDER — KETOROLAC TROMETHAMINE 30 MG/ML
10 INJECTION, SOLUTION INTRAMUSCULAR; INTRAVENOUS
Status: COMPLETED | OUTPATIENT
Start: 2023-10-11 | End: 2023-10-11

## 2023-10-11 RX ORDER — FAMOTIDINE 10 MG/ML
20 INJECTION INTRAVENOUS
Status: COMPLETED | OUTPATIENT
Start: 2023-10-11 | End: 2023-10-12

## 2023-10-11 RX ORDER — MAG HYDROX/ALUMINUM HYD/SIMETH 200-200-20
30 SUSPENSION, ORAL (FINAL DOSE FORM) ORAL ONCE
Status: COMPLETED | OUTPATIENT
Start: 2023-10-12 | End: 2023-10-12

## 2023-10-11 RX ORDER — ONDANSETRON 2 MG/ML
4 INJECTION INTRAMUSCULAR; INTRAVENOUS
Status: COMPLETED | OUTPATIENT
Start: 2023-10-11 | End: 2023-10-11

## 2023-10-11 RX ADMIN — ONDANSETRON 4 MG: 2 INJECTION INTRAMUSCULAR; INTRAVENOUS at 11:10

## 2023-10-11 RX ADMIN — KETOROLAC TROMETHAMINE 10 MG: 30 INJECTION INTRAMUSCULAR; INTRAVENOUS at 11:10

## 2023-10-12 VITALS
WEIGHT: 235 LBS | SYSTOLIC BLOOD PRESSURE: 184 MMHG | TEMPERATURE: 98 F | OXYGEN SATURATION: 98 % | HEART RATE: 59 BPM | BODY MASS INDEX: 39.11 KG/M2 | DIASTOLIC BLOOD PRESSURE: 84 MMHG | RESPIRATION RATE: 18 BRPM

## 2023-10-12 LAB
ALBUMIN SERPL BCP-MCNC: 3.6 G/DL (ref 3.5–5.2)
ALP SERPL-CCNC: 77 U/L (ref 55–135)
ALT SERPL W/O P-5'-P-CCNC: 14 U/L (ref 10–44)
ANION GAP SERPL CALC-SCNC: 9 MMOL/L (ref 8–16)
AST SERPL-CCNC: 12 U/L (ref 10–40)
BACTERIA #/AREA URNS AUTO: ABNORMAL /HPF
BASOPHILS # BLD AUTO: 0.1 K/UL (ref 0–0.2)
BASOPHILS NFR BLD: 0.8 % (ref 0–1.9)
BILIRUB SERPL-MCNC: 0.4 MG/DL (ref 0.1–1)
BILIRUB UR QL STRIP: NEGATIVE
BNP SERPL-MCNC: 191 PG/ML (ref 0–99)
BUN SERPL-MCNC: 4 MG/DL (ref 6–30)
BUN SERPL-MCNC: 5 MG/DL (ref 8–23)
CALCIUM SERPL-MCNC: 10 MG/DL (ref 8.7–10.5)
CHLORIDE SERPL-SCNC: 107 MMOL/L (ref 95–110)
CHLORIDE SERPL-SCNC: 109 MMOL/L (ref 95–110)
CLARITY UR REFRACT.AUTO: ABNORMAL
CO2 SERPL-SCNC: 21 MMOL/L (ref 23–29)
COLOR UR AUTO: YELLOW
CREAT SERPL-MCNC: 0.5 MG/DL (ref 0.5–1.4)
CREAT SERPL-MCNC: 0.7 MG/DL (ref 0.5–1.4)
DIFFERENTIAL METHOD: ABNORMAL
EOSINOPHIL # BLD AUTO: 0.3 K/UL (ref 0–0.5)
EOSINOPHIL NFR BLD: 2.5 % (ref 0–8)
ERYTHROCYTE [DISTWIDTH] IN BLOOD BY AUTOMATED COUNT: 13 % (ref 11.5–14.5)
EST. GFR  (NO RACE VARIABLE): >60 ML/MIN/1.73 M^2
GLUCOSE SERPL-MCNC: 133 MG/DL (ref 70–110)
GLUCOSE SERPL-MCNC: 138 MG/DL (ref 70–110)
GLUCOSE UR QL STRIP: NEGATIVE
HCT VFR BLD AUTO: 43.7 % (ref 37–48.5)
HCT VFR BLD CALC: 43 %PCV (ref 36–54)
HCV AB SERPL QL IA: NORMAL
HGB BLD-MCNC: 14.9 G/DL (ref 12–16)
HGB UR QL STRIP: NEGATIVE
HIV 1+2 AB+HIV1 P24 AG SERPL QL IA: NORMAL
HYALINE CASTS UR QL AUTO: 0 /LPF
IMM GRANULOCYTES # BLD AUTO: 0.03 K/UL (ref 0–0.04)
IMM GRANULOCYTES NFR BLD AUTO: 0.2 % (ref 0–0.5)
KETONES UR QL STRIP: ABNORMAL
LEUKOCYTE ESTERASE UR QL STRIP: ABNORMAL
LIPASE SERPL-CCNC: 9 U/L (ref 4–60)
LYMPHOCYTES # BLD AUTO: 1.6 K/UL (ref 1–4.8)
LYMPHOCYTES NFR BLD: 13.2 % (ref 18–48)
MCH RBC QN AUTO: 31 PG (ref 27–31)
MCHC RBC AUTO-ENTMCNC: 34.1 G/DL (ref 32–36)
MCV RBC AUTO: 91 FL (ref 82–98)
MICROSCOPIC COMMENT: ABNORMAL
MONOCYTES # BLD AUTO: 0.9 K/UL (ref 0.3–1)
MONOCYTES NFR BLD: 7.6 % (ref 4–15)
NEUTROPHILS # BLD AUTO: 9.1 K/UL (ref 1.8–7.7)
NEUTROPHILS NFR BLD: 75.7 % (ref 38–73)
NITRITE UR QL STRIP: NEGATIVE
NRBC BLD-RTO: 0 /100 WBC
PH UR STRIP: 6 [PH] (ref 5–8)
PLATELET # BLD AUTO: 246 K/UL (ref 150–450)
PMV BLD AUTO: 11.8 FL (ref 9.2–12.9)
POC IONIZED CALCIUM: 1.15 MMOL/L (ref 1.06–1.42)
POC TCO2 (MEASURED): 23 MMOL/L (ref 23–29)
POTASSIUM BLD-SCNC: 4.1 MMOL/L (ref 3.5–5.1)
POTASSIUM SERPL-SCNC: 4 MMOL/L (ref 3.5–5.1)
PROT SERPL-MCNC: 7.5 G/DL (ref 6–8.4)
PROT UR QL STRIP: ABNORMAL
RBC # BLD AUTO: 4.8 M/UL (ref 4–5.4)
RBC #/AREA URNS AUTO: 1 /HPF (ref 0–4)
SAMPLE: ABNORMAL
SODIUM BLD-SCNC: 141 MMOL/L (ref 136–145)
SODIUM SERPL-SCNC: 139 MMOL/L (ref 136–145)
SP GR UR STRIP: 1.02 (ref 1–1.03)
SQUAMOUS #/AREA URNS AUTO: 25 /HPF
TROPONIN I SERPL DL<=0.01 NG/ML-MCNC: <0.006 NG/ML (ref 0–0.03)
TROPONIN I SERPL DL<=0.01 NG/ML-MCNC: <0.006 NG/ML (ref 0–0.03)
URN SPEC COLLECT METH UR: ABNORMAL
WBC # BLD AUTO: 12.01 K/UL (ref 3.9–12.7)
WBC #/AREA URNS AUTO: 18 /HPF (ref 0–5)

## 2023-10-12 PROCEDURE — 84484 ASSAY OF TROPONIN QUANT: CPT | Performed by: NURSE PRACTITIONER

## 2023-10-12 PROCEDURE — 25000003 PHARM REV CODE 250: Performed by: NURSE PRACTITIONER

## 2023-10-12 PROCEDURE — 81001 URINALYSIS AUTO W/SCOPE: CPT | Performed by: NURSE PRACTITIONER

## 2023-10-12 PROCEDURE — 87086 URINE CULTURE/COLONY COUNT: CPT | Performed by: NURSE PRACTITIONER

## 2023-10-12 PROCEDURE — 96361 HYDRATE IV INFUSION ADD-ON: CPT

## 2023-10-12 RX ORDER — PANTOPRAZOLE SODIUM 20 MG/1
20 TABLET, DELAYED RELEASE ORAL DAILY
Qty: 30 TABLET | Refills: 0 | Status: SHIPPED | OUTPATIENT
Start: 2023-10-12 | End: 2024-10-11

## 2023-10-12 RX ORDER — FAMOTIDINE 20 MG/1
20 TABLET, FILM COATED ORAL 2 TIMES DAILY
Qty: 20 TABLET | Refills: 0 | Status: SHIPPED | OUTPATIENT
Start: 2023-10-12 | End: 2024-10-11

## 2023-10-12 RX ADMIN — SODIUM CHLORIDE 1000 ML: 9 INJECTION, SOLUTION INTRAVENOUS at 12:10

## 2023-10-12 RX ADMIN — FAMOTIDINE 20 MG: 10 INJECTION, SOLUTION INTRAVENOUS at 12:10

## 2023-10-12 RX ADMIN — ALUMINUM HYDROXIDE, MAGNESIUM HYDROXIDE, AND SIMETHICONE 30 ML: 200; 200; 20 SUSPENSION ORAL at 01:10

## 2023-10-12 NOTE — ED PROVIDER NOTES
"Encounter Date: 10/11/2023       History     Chief Complaint   Patient presents with    Chest Pain     Generalized CP desrcibed as aching x9 days. States "feels like locked gas"    Abdominal Pain     Epigastric abd pain x9 days     62 y/o female which presents to the ED with epigastric and chest pain that began 9 days ago after going to the beach. She states she at [employer] for breakfast, sandwich meats for lunch, and a [employer] hamburger for dinner. Upon getting home that night she began vomiting and continued every 20-30 minutes for the next 9 hours. Denies fevers. She is having diarrhea now that is bright yellow. Chest pain did not begin until after she had the vomiting.     The history is provided by the patient.     Review of patient's allergies indicates:  No Known Allergies  Past Medical History:   Diagnosis Date    Anxiety     Depression      History reviewed. No pertinent surgical history.  History reviewed. No pertinent family history.  Social History     Tobacco Use    Smoking status: Former     Current packs/day: 0.00     Types: Cigarettes     Quit date: 2019     Years since quittin.3    Smokeless tobacco: Never     Review of Systems   Constitutional:  Negative for fever.   HENT:  Negative for sore throat.    Respiratory:  Negative for shortness of breath.    Cardiovascular:  Positive for chest pain.   Gastrointestinal:  Positive for abdominal pain and diarrhea. Negative for nausea.   Genitourinary:  Negative for dysuria.   Musculoskeletal:  Negative for back pain.   Skin:  Negative for rash.   Neurological:  Negative for weakness.   Hematological:  Does not bruise/bleed easily.   All other systems reviewed and are negative.    Physical Exam     Initial Vitals [10/11/23 2314]   BP Pulse Resp Temp SpO2   (!) 175/82 62 18 98.1 °F (36.7 °C) 95 %      MAP       --         Physical Exam    Nursing note and vitals reviewed.  Constitutional: She appears well-developed and well-nourished.   HENT: "   Head: Normocephalic and atraumatic.   Eyes: Conjunctivae and EOM are normal. Pupils are equal, round, and reactive to light.   Neck:   Normal range of motion.  Cardiovascular:  Normal rate, regular rhythm, normal heart sounds and intact distal pulses.     Exam reveals no gallop and no friction rub.       No murmur heard.  Pulmonary/Chest: Breath sounds normal. No respiratory distress. She has no wheezes. She has no rhonchi. She has no rales. She exhibits no tenderness.   Abdominal: Abdomen is soft. Bowel sounds are normal. She exhibits no distension and no mass. There is abdominal tenderness in the right upper quadrant and epigastric area. There is no rebound and no guarding.   Musculoskeletal:         General: No tenderness or edema. Normal range of motion.      Cervical back: Normal range of motion.     Neurological: She is alert and oriented to person, place, and time. She has normal strength. GCS score is 15. GCS eye subscore is 4. GCS verbal subscore is 5. GCS motor subscore is 6.   Skin: Skin is warm. Capillary refill takes less than 2 seconds. No rash noted. No erythema.   Psychiatric: She has a normal mood and affect.       ED Course   Procedures  Labs Reviewed   CBC W/ AUTO DIFFERENTIAL - Abnormal; Notable for the following components:       Result Value    Gran # (ANC) 9.1 (*)     Gran % 75.7 (*)     Lymph % 13.2 (*)     All other components within normal limits    Narrative:     Release to patient->Immediate   ISTAT PROCEDURE - Abnormal; Notable for the following components:    POC Glucose 138 (*)     POC BUN 4 (*)     All other components within normal limits   HIV 1 / 2 ANTIBODY   HEPATITIS C ANTIBODY   COMPREHENSIVE METABOLIC PANEL   LIPASE   URINALYSIS, REFLEX TO URINE CULTURE   TROPONIN I   B-TYPE NATRIURETIC PEPTIDE   ISTAT CHEM8   POCT TROPONIN   POCT TROPONIN     EKG Readings: (Independently Interpreted)   Initial Reading: No STEMI. Rhythm: Sinus Bradycardia. Heart Rate: 56. Ectopy: No Ectopy.  Conduction: Normal. ST Segments: Normal ST Segments. T Waves: Normal. Clinical Impression: Sinus Bradycardia       Imaging Results              X-Ray Chest PA And Lateral (Final result)  Result time 10/12/23 00:17:16      Final result by Hema Hays MD (10/12/23 00:17:16)                   Impression:      Radiographic findings as above.      Electronically signed by: Hema Hays  Date:    10/12/2023  Time:    00:17               Narrative:    EXAMINATION:  XR CHEST PA AND LATERAL    CLINICAL HISTORY:  Chest Pain;    TECHNIQUE:  PA and lateral views of the chest were performed.    COMPARISON:  None    FINDINGS:  Two views of the chest are submitted.  The heart size and cardiomediastinal silhouette appear appropriate.    There is mild accentuation of the interstitial markings, this may relate to chronic interstitial change, may relate to mild interstitial infiltrate or edema.    There is no evidence for focal consolidation, significant pleural effusion or pneumothorax.    The osseous structures demonstrate chronic change.  There is diminished mineralization and degenerative change, multilevel chronic inflate change throughout the thoracic spine with vertebral body height loss noted, mild curvature of the spine noted.                                       Medications   sodium chloride 0.9% bolus 1,000 mL 1,000 mL (1,000 mLs Intravenous New Bag 10/12/23 0011)   aluminum-magnesium hydroxide-simethicone 200-200-20 mg/5 mL suspension 30 mL (has no administration in time range)   ketorolac injection 9.999 mg (9.999 mg Intravenous Given 10/11/23 2358)   ondansetron injection 4 mg (4 mg Intravenous Given 10/11/23 2357)   famotidine (PF) injection 20 mg (20 mg Intravenous Given 10/12/23 0016)     Medical Decision Making  64 y/o female which presents with epigastric pain and vomiting that began 9 days ago. She only vomited for 1 day but has continued to have the pain and decreased appetite. Labs unremarkable but  some are still pending. Patient care has been transitioned to Dr. Leandra Johnston @  0100 for final dispo.     Amount and/or Complexity of Data Reviewed  Labs: ordered. Decision-making details documented in ED Course.  Radiology: ordered.    Risk  OTC drugs.  Prescription drug management.               ED Course as of 10/12/23 0052   Wed Oct 11, 2023   2324 BP(!): 175/82 [AT]   2324 Temp: 98.1 °F (36.7 °C) [AT]   2324 Temp Source: Oral [AT]   2324 Resp: 18 [AT]   2324 Pulse: 62 [AT]   2324 Resp: 18 [AT]   2324 SpO2: 95 % [AT]   Thu Oct 12, 2023   0014 POC Glucose(!): 138 [AT]   0014 POC Creatinine: 0.5 [AT]   0042 Pt feeling much better since receiving pepcid, toradol, and milk of magnesia. Most likely gastritis.  [AT]      ED Course User Index  [AT] Glenna Noguera FNP                    Clinical Impression:   Final diagnoses:  [R07.9] Chest pain  [K29.70] Gastritis, presence of bleeding unspecified, unspecified chronicity, unspecified gastritis type (Primary)  [R10.13] Epigastric pain        ED Disposition Condition    Discharge Stable          ED Prescriptions    None       Follow-up Information    None          Glenna Noguera FNP  10/12/23 0052

## 2023-10-12 NOTE — ED NOTES
I-STAT Chem-8+ Results:   Value Reference Range   Sodium 141 136-145 mmol/L   Potassium  4.1 3.5-5.1 mmol/L   Chloride 107  mmol/L   Ionized Calcium 1.15 1.06-1.42 mmol/L   CO2 (measured) 23 23-29 mmol/L   Glucose 138  mg/dL   BUN 4 6-30 mg/dL   Creatinine 0.5 0.5-1.4 mg/dL   Hematocrit 43 36-54%

## 2023-10-12 NOTE — DISCHARGE INSTRUCTIONS
I recommend follow up with gastroenterology if your symptoms persist.    I have started you on pepcid and protonix which are acid blockers.

## 2023-10-12 NOTE — PROVIDER PROGRESS NOTES - EMERGENCY DEPT.
Encounter Date: 10/11/2023    ED Physician Progress Notes        ED Physician Hand-off Note:    ED Course: I assumed care of patient from off-going ED physician team. Briefly, Patient is a 64 yo F with PMH of anxiety/depression who presents with epigastric abdominal pain.    At the time of signout plan was pending - work up.    Medications given in the ED:    Medications   ketorolac injection 9.999 mg (9.999 mg Intravenous Given 10/11/23 2358)   ondansetron injection 4 mg (4 mg Intravenous Given 10/11/23 2357)   sodium chloride 0.9% bolus 1,000 mL 1,000 mL (0 mLs Intravenous Stopped 10/12/23 0314)   famotidine (PF) injection 20 mg (20 mg Intravenous Given 10/12/23 0016)   aluminum-magnesium hydroxide-simethicone 200-200-20 mg/5 mL suspension 30 mL (30 mLs Oral Given 10/12/23 0118)     Imaging Results              X-Ray Chest PA And Lateral (Final result)  Result time 10/12/23 00:17:16      Final result by Hema Hays MD (10/12/23 00:17:16)                   Impression:      Radiographic findings as above.      Electronically signed by: Hema Hays  Date:    10/12/2023  Time:    00:17               Narrative:    EXAMINATION:  XR CHEST PA AND LATERAL    CLINICAL HISTORY:  Chest Pain;    TECHNIQUE:  PA and lateral views of the chest were performed.    COMPARISON:  None    FINDINGS:  Two views of the chest are submitted.  The heart size and cardiomediastinal silhouette appear appropriate.    There is mild accentuation of the interstitial markings, this may relate to chronic interstitial change, may relate to mild interstitial infiltrate or edema.    There is no evidence for focal consolidation, significant pleural effusion or pneumothorax.    The osseous structures demonstrate chronic change.  There is diminished mineralization and degenerative change, multilevel chronic inflate change throughout the thoracic spine with vertebral body height loss noted, mild curvature of the spine noted.                                         4:36 AM  I re-evaluated the patient she is feeling much better.  I was able to palpate her abdomen which is soft and nontender. I updated her on her results.    Disposition: home    Patient comfortable with discharge. Patient counseled regarding exam, results, diagnosis, treatment, and plan.    Impression: Final diagnoses:  [R07.9] Chest pain  [K29.70] Gastritis, presence of bleeding unspecified, unspecified chronicity, unspecified gastritis type (Primary)  [R10.13] Epigastric pain

## 2023-10-12 NOTE — ED TRIAGE NOTES
"Salud Caballero, jesus 63 y.o. female presents to the ED w/ complaint of chest pain and abdominal pain. Reports generalized aching and epigastric pain for the last 10 days    Adult Physical Assessment  LOC: Salud Caballero, 63 y.o. female verified via two identifiers.  The patient is awake, alert, oriented and speaking appropriately at this time.  APPEARANCE: Patient resting comfortably and appears to be in no acute distress at this time. Patient is clean and well groomed, patient's clothing is properly fastened.  SKIN:The skin is warm and dry, color consistent with ethnicity, patient has normal skin turgor and moist mucus membranes, skin intact, no breakdown or brusing noted.  MUSCULOSKELETAL: Patient moving all extremities well, no obvious swelling or deformities noted.  RESPIRATORY: Airway is open and patent, respirations are spontaneous, patient has a normal effort and rate, no accessory muscle use noted.  CARDIAC: Patient has a normal rate and rhythm, no periphreal edema noted in any extremity, capillary refill < 3 seconds in all extremities. Reports generalized chest pain for the last 10 days.   ABDOMEN: Soft and non tender to palpation, no abdominal distention noted. Bowel sounds present in all four quadrants. Reports epigastric pain for the last 10 days, also reports some diarrhea  NEUROLOGIC: Eyes open spontaneously, behavior appropriate to situation, follows commands, facial expression symmetrical, bilateral hand grasp equal and even, purposeful motor response noted, normal sensation in all extremities when touched with a finger.      Triage note:  Chief Complaint   Patient presents with    Chest Pain     Generalized CP desrcibed as aching x9 days. States "feels like locked gas"    Abdominal Pain     Epigastric abd pain x9 days     Review of patient's allergies indicates:  No Known Allergies  Past Medical History:   Diagnosis Date    Anxiety     Depression        "

## 2023-10-13 LAB
BACTERIA UR CULT: NORMAL
BACTERIA UR CULT: NORMAL

## 2023-11-16 ENCOUNTER — OFFICE VISIT (OUTPATIENT)
Dept: GASTROENTEROLOGY | Facility: CLINIC | Age: 63
End: 2023-11-16

## 2023-11-16 VITALS
DIASTOLIC BLOOD PRESSURE: 83 MMHG | BODY MASS INDEX: 41.02 KG/M2 | HEART RATE: 57 BPM | HEIGHT: 63 IN | WEIGHT: 231.5 LBS | SYSTOLIC BLOOD PRESSURE: 131 MMHG

## 2023-11-16 DIAGNOSIS — R10.9 ABDOMINAL PAIN, UNSPECIFIED ABDOMINAL LOCATION: Primary | ICD-10-CM

## 2023-11-16 PROCEDURE — 99213 OFFICE O/P EST LOW 20 MIN: CPT | Mod: PBBFAC | Performed by: STUDENT IN AN ORGANIZED HEALTH CARE EDUCATION/TRAINING PROGRAM

## 2023-11-16 PROCEDURE — 99204 PR OFFICE/OUTPT VISIT, NEW, LEVL IV, 45-59 MIN: ICD-10-PCS | Mod: S$PBB,,, | Performed by: STUDENT IN AN ORGANIZED HEALTH CARE EDUCATION/TRAINING PROGRAM

## 2023-11-16 PROCEDURE — 99999 PR PBB SHADOW E&M-EST. PATIENT-LVL III: CPT | Mod: PBBFAC,,, | Performed by: STUDENT IN AN ORGANIZED HEALTH CARE EDUCATION/TRAINING PROGRAM

## 2023-11-16 PROCEDURE — 99204 OFFICE O/P NEW MOD 45 MIN: CPT | Mod: S$PBB,,, | Performed by: STUDENT IN AN ORGANIZED HEALTH CARE EDUCATION/TRAINING PROGRAM

## 2023-11-16 PROCEDURE — 99999 PR PBB SHADOW E&M-EST. PATIENT-LVL III: ICD-10-PCS | Mod: PBBFAC,,, | Performed by: STUDENT IN AN ORGANIZED HEALTH CARE EDUCATION/TRAINING PROGRAM

## 2023-11-16 RX ORDER — OMEPRAZOLE 40 MG/1
40 CAPSULE, DELAYED RELEASE ORAL DAILY
Qty: 30 CAPSULE | Refills: 11 | Status: SHIPPED | OUTPATIENT
Start: 2023-11-16 | End: 2024-11-15

## 2023-11-16 RX ORDER — DICYCLOMINE HYDROCHLORIDE 10 MG/1
10 CAPSULE ORAL
Qty: 120 CAPSULE | Refills: 0 | Status: SHIPPED | OUTPATIENT
Start: 2023-11-16 | End: 2023-12-16

## 2023-11-16 NOTE — PROGRESS NOTES
"    [facility] Consultation Note    Reason for Consult:  abdominal pain, N/V, Diarrhea     PCP:   Shayy, Primary Doctor       Referring MD:  No referring provider defined for this encounter.    HPI:  This is a 63 y.o. female here for evaluation of abdominal pain. PMHx of Anxiety and Depression. She states that at the beginning of October, she developed sudden onset of nausea and NBNB emesis. Also was having loose stools. After a few days of this, she started to developed upper abdominal pain which was severe. Pain was waking her from sleep and associated with nausea. After the pain persisted for a few days, she presented to ED. No hematemesis. No blood in stool. Blood work at that time showed some dehydration on CMP but otherwise normal. Normal CBC and lipase. CXR was unremarkable. She was given pepcid and PPI 20mg and discharged. She improved but then last week the pain again returned and woke her from sleep. Pain lasted most of the day. This time she did not have N/V. Stools are stool loose or mushy. No blood. Has not had any colonoscopy in her lifetime. Denied NSAID use. No Fh of CRC, gastric cancer, celiac or IBD.  She has no insurance currently. Working on getting medicaid.     Objective Findings:    Vital Signs:  /83 (BP Location: Left arm, Patient Position: Sitting, BP Method: Large (Automatic))   Pulse (!) 57   Ht 5' 3" (1.6 m)   Wt 105 kg (231 lb 7.7 oz)   BMI 41.01 kg/m²   Body mass index is 41.01 kg/m².    Physical Exam:  General Appearance: Well appearing in no acute distress  Head:   Normocephalic, without obvious abnormality  Eyes:    No scleral icterus    Lungs: CTA bilaterally in anterior and posterior fields   Heart:  Regular rate and rhythm, no murmurs heard  Abdomen: Soft, non tender, non distended with positive bowel sounds in all four quadrants.      Imaging:  Reviewed     Endoscopy:    None     Assessment:    Abdominal pain   N/V   Diarrhea    Patient with sudden " onset of N/V and diarrhea last month which persisted for a few days and then she started to developed abdominal pain. The pain initially improved but recurred last week. Pain wakes from sleep, is in the upper an RUQ of abdomen. Some characteristics of biliary colic. Check US. Repeat blood work. Also check HP stool and start PPI after. Can try bentyl PRN for pain as well. Would like to consider endoscopy especially as she has not had a cscope in her lifetime but she has no insurance currently. Will give her financial info       Recommendations:    - CBC, CMP, CRP, calpro   - US ab   - HP stool test   - Start PPI  - Bentyl PRN   - Further plan based on results     RTC 3 months       Order summary:  Orders Placed This Encounter    US Abdomen Complete    CBC Auto Differential    COMPREHENSIVE METABOLIC PANEL    H. pylori antigen, stool    Calprotectin, Stool    C-reactive protein    dicyclomine (BENTYL) 10 MG capsule    omeprazole (PRILOSEC) 40 MG capsule         Thank you so much for allowing me to participate in the care of Salud St Fred Golden MD  Gastroenterology   [facility]